# Patient Record
Sex: FEMALE | Race: BLACK OR AFRICAN AMERICAN | NOT HISPANIC OR LATINO
[De-identification: names, ages, dates, MRNs, and addresses within clinical notes are randomized per-mention and may not be internally consistent; named-entity substitution may affect disease eponyms.]

---

## 2017-12-27 PROBLEM — Z00.00 ENCOUNTER FOR PREVENTIVE HEALTH EXAMINATION: Status: ACTIVE | Noted: 2017-12-27

## 2018-01-23 ENCOUNTER — APPOINTMENT (OUTPATIENT)
Dept: PLASTIC SURGERY | Facility: CLINIC | Age: 53
End: 2018-01-23

## 2018-05-31 ENCOUNTER — OUTPATIENT (OUTPATIENT)
Dept: OUTPATIENT SERVICES | Facility: HOSPITAL | Age: 53
LOS: 1 days | Discharge: HOME | End: 2018-05-31

## 2018-05-31 DIAGNOSIS — Z12.31 ENCOUNTER FOR SCREENING MAMMOGRAM FOR MALIGNANT NEOPLASM OF BREAST: ICD-10-CM

## 2019-06-18 ENCOUNTER — APPOINTMENT (OUTPATIENT)
Dept: PLASTIC SURGERY | Facility: CLINIC | Age: 54
End: 2019-06-18

## 2019-07-23 ENCOUNTER — APPOINTMENT (OUTPATIENT)
Dept: PLASTIC SURGERY | Facility: CLINIC | Age: 54
End: 2019-07-23
Payer: MEDICAID

## 2019-07-23 VITALS — BODY MASS INDEX: 36.1 KG/M2 | HEIGHT: 67 IN | WEIGHT: 230 LBS

## 2019-07-23 DIAGNOSIS — Z78.9 OTHER SPECIFIED HEALTH STATUS: ICD-10-CM

## 2019-07-23 DIAGNOSIS — Z86.79 PERSONAL HISTORY OF OTHER DISEASES OF THE CIRCULATORY SYSTEM: ICD-10-CM

## 2019-07-23 DIAGNOSIS — Z86.39 PERSONAL HISTORY OF OTHER ENDOCRINE, NUTRITIONAL AND METABOLIC DISEASE: ICD-10-CM

## 2019-07-23 PROCEDURE — 99212 OFFICE O/P EST SF 10 MIN: CPT

## 2019-07-23 RX ORDER — METOPROLOL TARTRATE 50 MG/1
50 TABLET, FILM COATED ORAL
Refills: 0 | Status: ACTIVE | COMMUNITY

## 2019-07-23 RX ORDER — FISH OIL/E/FATTY ACID5/HERB137 400 MG-5
CAPSULE ORAL
Refills: 0 | Status: ACTIVE | COMMUNITY

## 2019-07-23 RX ORDER — METFORMIN HYDROCHLORIDE 500 MG/1
500 TABLET, COATED ORAL
Refills: 0 | Status: ACTIVE | COMMUNITY

## 2019-07-23 NOTE — HISTORY OF PRESENT ILLNESS
[FreeTextEntry1] : Pt is a 55 y/o F with PMH of NIDDM and HTN who was seen in 2013 for keloids to face s/p acne. Pt had left cheek removed and did well postoperatively. Now presents for a keloid to central chest x2 years, getting progressively larger; initially a small pimple. Has had steroid injection over 8-10 visits with dermatologist ([facility], [place]), reports no improvement. C/o discomfort and pruritus.\par \par Nonsmoker

## 2019-07-23 NOTE — PHYSICAL EXAM
[de-identified] : well-appearing, NAD [de-identified] : Central chest with 3.75x1 cm and 2.5 x 0.5 cm horizontal keloids, larger keloid inferior to smaller

## 2019-07-23 NOTE — ASSESSMENT
[FreeTextEntry1] : 53 y/o F with central chest keloid x2\par \par PE  2.5cm x 0.5cm sup keloid\par      4cm x 1cm inferior keloid\par \par suggest excision w steroid injection\par \par Regarding the procedure, we discussed scarring, poor wound healing, bleeding, infection, need for additional surgery, and dissatisfaction with the outcome.  Also discussed possibility of keloid and/or hypertrophic scar formation as well as recurrence.  All questions were answered and risks understood.\par \par Pt aware recurrence

## 2019-09-06 ENCOUNTER — APPOINTMENT (OUTPATIENT)
Dept: PLASTIC SURGERY | Facility: CLINIC | Age: 54
End: 2019-09-06
Payer: MEDICAID

## 2019-09-06 ENCOUNTER — LABORATORY RESULT (OUTPATIENT)
Age: 54
End: 2019-09-06

## 2019-09-06 ENCOUNTER — OUTPATIENT (OUTPATIENT)
Dept: OUTPATIENT SERVICES | Facility: HOSPITAL | Age: 54
LOS: 1 days | Discharge: HOME | End: 2019-09-06

## 2019-09-06 PROCEDURE — 11406 EXC TR-EXT B9+MARG >4.0 CM: CPT

## 2019-09-06 PROCEDURE — 13102 CMPLX RPR TRUNK ADDL 5CM/<: CPT

## 2019-09-06 PROCEDURE — 13101 CMPLX RPR TRUNK 2.6-7.5 CM: CPT | Mod: 59

## 2019-09-06 PROCEDURE — 11402 EXC TR-EXT B9+MARG 1.1-2 CM: CPT | Mod: 59

## 2019-09-09 DIAGNOSIS — L91.0 HYPERTROPHIC SCAR: ICD-10-CM

## 2019-09-11 NOTE — PROCEDURE
[FreeTextEntry6] : Patient is a 54 year old female with  central chest superior  keloid measuring approximately 2.5 x 0.5 cm and central chest inferior keloid measuring approximately 4 cm  x 1 cm. \par \par The areas were  prepped and draped in the usual fashion.  Local anesthetic was administered using 1% lidocaine with epinephrine.\par \par Keloids were  sharply excised.  Areas were irrigated copiously.  Complex wound closure was performed in layers.  The superior keloid wound measuring  approximately 3 cm.and inferior keloid wound  measuring approximately 5 cm.\par \par Sterile dressing applied.  \par \par Patient tolerated procedure well and understands post-op instructions.\par \par Sutures Used: 3-0 prolene\par \par \par \par \par \par

## 2019-09-16 ENCOUNTER — APPOINTMENT (OUTPATIENT)
Dept: PLASTIC SURGERY | Facility: CLINIC | Age: 54
End: 2019-09-16
Payer: MEDICAID

## 2019-09-17 ENCOUNTER — APPOINTMENT (OUTPATIENT)
Dept: PLASTIC SURGERY | Facility: CLINIC | Age: 54
End: 2019-09-17
Payer: MEDICAID

## 2019-09-17 PROCEDURE — 99212 OFFICE O/P EST SF 10 MIN: CPT

## 2019-09-17 NOTE — ASSESSMENT
[FreeTextEntry1] : 55 y/o F with central chest keloid x2, now POD #11 s/p excision with steroid injection, with superficial dehisence to both\par -Sutures removed\par -Daily aquaphor x1 week, then scaraway\par -F/u 2 weeks with Dr. Melendez

## 2019-09-17 NOTE — PHYSICAL EXAM
[de-identified] : well-appearing, NAD [de-identified] : Central chest two healing incisions, superior scar with 3cm superficial dehicsence centrally, inferior with 1cm superficial dehiscenses to both lateral aspects, no s/s cellulitis

## 2019-09-17 NOTE — HISTORY OF PRESENT ILLNESS
[FreeTextEntry1] : Pt is a 53 y/o F with PMH of NIDDM and HTN who was seen in 2013 for keloids to face s/p acne. Pt had left cheek removed and did well postoperatively. Now presents for a keloid to central chest x2 years, getting progressively larger; initially a small pimple. Has had steroid injection over 8-10 visits with dermatologist ([facility], [place]), reports no improvement. C/o discomfort and pruritus.\par \par Nonsmoker\par \par Interval hx (9/17/19): Pt presents for f/u- POD #11 s/p excision of central chest keloid x2 with steroid injections. Pt offers no complaints. States she went to the gym twice after procedure, started the day after, and was using the bench press.

## 2019-09-17 NOTE — DATA REVIEWED
[FreeTextEntry1] : \par  Biopsy             Final\par \par No Documents Attached\par \par \par \par \par   PRIETO, ELLA                         1\par \par \par \par                  Surgical Final Report\par \par \par \par \par           Final Diagnosis\par           Central chest keloid, excision:\par            - Keloid.\par \par           Verified by: Gustavo Bcaa M.D.\par           (Electronic Signature)\par           Reported on: 09/12/19 17:15 EDT, [address] [place] [address],[facility] [address]\par           _________________________________________________________________\par \par           Clinical History\par           Excision central chest keloids\par \par           Specimen(s) Submitted\par           Central chest keloid\par \par           Gross Description\par           The specimen is received in formalin, labeled "central chest\par           keloid" and consists of two unoriented fragments of dark gray\par           skin ellipse with underlying subcutaneous tissue. The larger\par           fragment measures 3.5 x 1.1 x 1.1 cm. The smaller fragment\par           measures 2.2 x 0.5 x 0.5 cm. Serial section reveals dark gray to\par           brown cut surface. Representative sections are submitted. (1\par           block)\par \par           Specimen was received and underwent gross examination at [facility], [address] [place] [address],\[facility] [address].\par \par           09/10/19 09:31 mt\par \par           Perioperative Diagnosis\par           L91.0-Hypertrophic scar\par \par  \par \par  Ordered by: LUX GOMES IV       Collected/Examined: 06Sep2019 11:46AM       \par Verified by: LOAN GOODMAN 16Sep2019 02:29PM       \par  Result Communication: Discussed results with patient;\par Stage: Final       \par  Performed at: [facility] (Med Director: Nahum Simms M.D)       Resulted: 12Sep2019 05:15PM       Last Updated: 16Sep2019 02:29PM       Accession: [id number]

## 2019-10-01 ENCOUNTER — APPOINTMENT (OUTPATIENT)
Dept: PLASTIC SURGERY | Facility: CLINIC | Age: 54
End: 2019-10-01

## 2021-08-13 ENCOUNTER — APPOINTMENT (OUTPATIENT)
Dept: PLASTIC SURGERY | Facility: CLINIC | Age: 56
End: 2021-08-13
Payer: COMMERCIAL

## 2021-08-13 PROCEDURE — 99212 OFFICE O/P EST SF 10 MIN: CPT

## 2021-08-13 NOTE — PHYSICAL EXAM
[de-identified] : well-appearing, NAD [de-identified] : Central chest two healing incisions, superior scar with 3cm superficial dehicsence centrally, inferior with 1cm superficial dehiscenses to both lateral aspects, no s/s cellulitis

## 2021-08-13 NOTE — ASSESSMENT
[FreeTextEntry1] : 55 y/o F with central chest keloid x2, now POD #11 s/p excision with steroid injection, with superficial dehisence to both\par -Sutures removed\par -Daily aquaphor x1 week, then scaraway\par -F/u 2 weeks with Dr. Melendez\par \par 8/13/2021\par as above\par doing well overall except has two recurrent chest keloids\par \par sup 7.5cm x 1.6cm     inf 8.2cm x 2cm\par \par suggest excision w post procedure RT--pt previously had excision w post procedure RT for left facial keloid w excellent results\par \par to see Vignieri again\par \par Regarding the procedure, we discussed scarring, poor wound healing, bleeding, infection, need for additional surgery, and dissatisfaction with the outcome.  Also discussed possibility of keloid and/or hypertrophic scar formation as well as recurrence.  All questions were answered and risks understood.\par \par vaccinated\par \par Photos taken with patient permission.\par \par \par Due to COVID 19, pre-visit patient instructions were explained to the patient and their symptoms were checked upon arrival.  \par Masks were used by the health care providers and staff and the examination room was cleaned after the patient visit was completed.\par

## 2021-08-13 NOTE — DATA REVIEWED
[FreeTextEntry1] : \par  Biopsy             Final\par \par No Documents Attached\par \par \par \par \par   PRIETO, ELLA                         1\par \par \par \par                  Surgical Final Report\par \par \par \par \par           Final Diagnosis\par           Central chest keloid, excision:\par            - Keloid.\par \par           Verified by: Gustavo Baca M.D.\par           (Electronic Signature)\par           Reported on: 09/12/19 17:15 EDT, [address] [place] [address],[facility] [address]\par           _________________________________________________________________\par \par           Clinical History\par           Excision central chest keloids\par \par           Specimen(s) Submitted\par           Central chest keloid\par \par           Gross Description\par           The specimen is received in formalin, labeled "central chest\par           keloid" and consists of two unoriented fragments of dark gray\par           skin ellipse with underlying subcutaneous tissue. The larger\par           fragment measures 3.5 x 1.1 x 1.1 cm. The smaller fragment\par           measures 2.2 x 0.5 x 0.5 cm. Serial section reveals dark gray to\par           brown cut surface. Representative sections are submitted. (1\par           block)\par \par           Specimen was received and underwent gross examination at [facility], [address] [place] [address],\[facility] [address].\par \par           09/10/19 09:31 mt\par \par           Perioperative Diagnosis\par           L91.0-Hypertrophic scar\par \par  \par \par  Ordered by: LUX GOMES IV       Collected/Examined: 06Sep2019 11:46AM       \par Verified by: LOAN GOODMAN 16Sep2019 02:29PM       \par  Result Communication: Discussed results with patient;\par Stage: Final       \par  Performed at: [facility] (Med Director: Nahum Simms M.D)       Resulted: 12Sep2019 05:15PM       Last Updated: 16Sep2019 02:29PM       Accession: [id number]

## 2021-08-13 NOTE — HISTORY OF PRESENT ILLNESS
[FreeTextEntry1] : Pt is a 55 y/o F with PMH of NIDDM and HTN who was seen in 2013 for keloids to face s/p acne. Pt had left cheek removed and did well postoperatively. Now presents for a keloid to central chest x2 years, getting progressively larger; initially a small pimple. Has had steroid injection over 8-10 visits with dermatologist ([facility], [place]), reports no improvement. C/o discomfort and pruritus.\par \par Nonsmoker\par \par Interval hx (9/17/19): Pt presents for f/u- POD #11 s/p excision of central chest keloid x2 with steroid injections. Pt offers no complaints. States she went to the gym twice after procedure, started the day after, and was using the bench press.

## 2021-08-25 ENCOUNTER — APPOINTMENT (OUTPATIENT)
Dept: RADIATION ONCOLOGY | Facility: HOSPITAL | Age: 56
End: 2021-08-25
Payer: COMMERCIAL

## 2021-08-25 ENCOUNTER — OUTPATIENT (OUTPATIENT)
Dept: OUTPATIENT SERVICES | Facility: HOSPITAL | Age: 56
LOS: 1 days | Discharge: HOME | End: 2021-08-25
Payer: COMMERCIAL

## 2021-08-25 VITALS
RESPIRATION RATE: 12 BRPM | HEART RATE: 77 BPM | SYSTOLIC BLOOD PRESSURE: 176 MMHG | TEMPERATURE: 97.4 F | DIASTOLIC BLOOD PRESSURE: 91 MMHG | WEIGHT: 240 LBS | BODY MASS INDEX: 37.59 KG/M2

## 2021-08-25 DIAGNOSIS — L91.0 HYPERTROPHIC SCAR: ICD-10-CM

## 2021-08-25 PROCEDURE — 99242 OFF/OP CONSLTJ NEW/EST SF 20: CPT | Mod: 25

## 2021-08-25 PROCEDURE — 77263 THER RADIOLOGY TX PLNG CPLX: CPT

## 2021-08-25 NOTE — PHYSICAL EXAM
[Normal] : well developed, well nourished, in no acute distress [de-identified] : There remain several keloids on the cheeks and upper neck (near the ears).  Otherwise on the anterior chest wall are two keloids as whoon, both about 6.5 cm.

## 2021-08-25 NOTE — HISTORY OF PRESENT ILLNESS
[FreeTextEntry1] : Bambi Rooney is a 56 year old known to us from 2013 when keloids on her cheek were treated with excision and post-operative radiation. \par \par She now presents with two keloids on her anterior chest (see above).  She is agreeable to excision to be followed by radiation.

## 2021-08-25 NOTE — DISEASE MANAGEMENT
[Clinical] : TNM Stage: c [N/A] : Currently not applicable [TTNM] : . [NTNM] : . [MTNM] : . [de-identified] : Keloid on chest

## 2021-09-13 ENCOUNTER — NON-APPOINTMENT (OUTPATIENT)
Age: 56
End: 2021-09-13

## 2022-02-24 ENCOUNTER — APPOINTMENT (OUTPATIENT)
Dept: PLASTIC SURGERY | Facility: CLINIC | Age: 57
End: 2022-02-24
Payer: COMMERCIAL

## 2022-02-24 PROCEDURE — 99212 OFFICE O/P EST SF 10 MIN: CPT

## 2022-02-24 NOTE — PHYSICAL EXAM
[de-identified] : well-appearing, NAD [de-identified] : Central chest with two large keloids, raised ad tender to palpation - superior measuring 7.5 x 1.3 cm, inferior keloid 7x1.8 cm

## 2022-02-24 NOTE — HISTORY OF PRESENT ILLNESS
[FreeTextEntry1] : Pt is a 55 y/o F with PMH of NIDDM and HTN who was seen in 2013 for keloids to face s/p acne. Pt had left cheek removed and did well postoperatively. Now presents for a keloid to central chest x2 years, getting progressively larger; initially a small pimple. Has had steroid injection over 8-10 visits with dermatologist ([facility], [place]), reports no improvement. C/o discomfort and pruritus.\par \par Nonsmoker\par \par Interval hx (9/17/19): Pt presents for f/u- POD #11 s/p excision of central chest keloid x2 with steroid injections. Pt offers no complaints. States she went to the gym twice after procedure, started the day after, and was using the bench press.\par \par Interval hx (2/24/22): Pt presents today for f/u s/p excision of central chest keloid x2 in September 2019 with recurrence c/o burning pain, pressure and pruritus. Saw Dr. Padilla and is a candidate for RT post excision.

## 2022-02-24 NOTE — ASSESSMENT
[FreeTextEntry1] : 57 y/o F with central chest keloid x2, now s/p excision with steroid injection, with superficial dehiscence to both\par now with recurrence and painful scar. \par \par - recommend excision with post-op RT\par \par \par \par Due to COVID 19, pre-visit patient instructions were explained to the patient and their symptoms were checked upon arrival.  \par Masks were used by the health care providers and staff and the examination room was cleaned after the patient visit was completed.\par \par \par prior RT submitted for chest keldoi denied acc to RT--I have contacted Dr Padilla for further information/discussion\par \par surgical excision of chest keloids w postprocedure RT remains the best tx option for these two large chest keloids (which are slighly larger than prior visit--approx 7cm x 1.5cm and 7.2cm x 1.8cm)\par \par def plan pending more info re RT\par \par Due to COVID 19, pre-visit patient instructions were explained to the patient and their symptoms were checked upon arrival.  \par Masks were used by the health care providers and staff and the examination room was cleaned after the patient visit was completed.\par

## 2022-03-15 ENCOUNTER — OUTPATIENT (OUTPATIENT)
Dept: OUTPATIENT SERVICES | Facility: HOSPITAL | Age: 57
LOS: 1 days | Discharge: HOME | End: 2022-03-15
Payer: COMMERCIAL

## 2022-03-15 VITALS
DIASTOLIC BLOOD PRESSURE: 88 MMHG | RESPIRATION RATE: 18 BRPM | HEIGHT: 67 IN | TEMPERATURE: 97 F | HEART RATE: 58 BPM | SYSTOLIC BLOOD PRESSURE: 140 MMHG | WEIGHT: 238.1 LBS | OXYGEN SATURATION: 100 %

## 2022-03-15 DIAGNOSIS — Z01.818 ENCOUNTER FOR OTHER PREPROCEDURAL EXAMINATION: ICD-10-CM

## 2022-03-15 DIAGNOSIS — L91.0 HYPERTROPHIC SCAR: ICD-10-CM

## 2022-03-15 DIAGNOSIS — Z98.891 HISTORY OF UTERINE SCAR FROM PREVIOUS SURGERY: Chronic | ICD-10-CM

## 2022-03-15 DIAGNOSIS — Z98.890 OTHER SPECIFIED POSTPROCEDURAL STATES: Chronic | ICD-10-CM

## 2022-03-15 LAB
A1C WITH ESTIMATED AVERAGE GLUCOSE RESULT: 10 % — HIGH (ref 4–5.6)
ALBUMIN SERPL ELPH-MCNC: 4.6 G/DL — SIGNIFICANT CHANGE UP (ref 3.5–5.2)
ALP SERPL-CCNC: 88 U/L — SIGNIFICANT CHANGE UP (ref 30–115)
ALT FLD-CCNC: 28 U/L — SIGNIFICANT CHANGE UP (ref 0–41)
ANION GAP SERPL CALC-SCNC: 13 MMOL/L — SIGNIFICANT CHANGE UP (ref 7–14)
APTT BLD: 28.6 SEC — SIGNIFICANT CHANGE UP (ref 27–39.2)
AST SERPL-CCNC: 21 U/L — SIGNIFICANT CHANGE UP (ref 0–41)
BASOPHILS # BLD AUTO: 0.03 K/UL — SIGNIFICANT CHANGE UP (ref 0–0.2)
BASOPHILS NFR BLD AUTO: 0.6 % — SIGNIFICANT CHANGE UP (ref 0–1)
BILIRUB SERPL-MCNC: 0.4 MG/DL — SIGNIFICANT CHANGE UP (ref 0.2–1.2)
BUN SERPL-MCNC: 20 MG/DL — SIGNIFICANT CHANGE UP (ref 10–20)
CALCIUM SERPL-MCNC: 9.4 MG/DL — SIGNIFICANT CHANGE UP (ref 8.5–10.1)
CHLORIDE SERPL-SCNC: 102 MMOL/L — SIGNIFICANT CHANGE UP (ref 98–110)
CO2 SERPL-SCNC: 26 MMOL/L — SIGNIFICANT CHANGE UP (ref 17–32)
CREAT SERPL-MCNC: 0.9 MG/DL — SIGNIFICANT CHANGE UP (ref 0.7–1.5)
EGFR: 75 ML/MIN/1.73M2 — SIGNIFICANT CHANGE UP
EOSINOPHIL # BLD AUTO: 0.13 K/UL — SIGNIFICANT CHANGE UP (ref 0–0.7)
EOSINOPHIL NFR BLD AUTO: 2.4 % — SIGNIFICANT CHANGE UP (ref 0–8)
ESTIMATED AVERAGE GLUCOSE: 240 MG/DL — HIGH (ref 68–114)
GLUCOSE SERPL-MCNC: 212 MG/DL — HIGH (ref 70–99)
HCT VFR BLD CALC: 38.5 % — SIGNIFICANT CHANGE UP (ref 37–47)
HGB BLD-MCNC: 12.7 G/DL — SIGNIFICANT CHANGE UP (ref 12–16)
IMM GRANULOCYTES NFR BLD AUTO: 0.2 % — SIGNIFICANT CHANGE UP (ref 0.1–0.3)
LYMPHOCYTES # BLD AUTO: 1.99 K/UL — SIGNIFICANT CHANGE UP (ref 1.2–3.4)
LYMPHOCYTES # BLD AUTO: 36.6 % — SIGNIFICANT CHANGE UP (ref 20.5–51.1)
MCHC RBC-ENTMCNC: 26.3 PG — LOW (ref 27–31)
MCHC RBC-ENTMCNC: 33 G/DL — SIGNIFICANT CHANGE UP (ref 32–37)
MCV RBC AUTO: 79.9 FL — LOW (ref 81–99)
MONOCYTES # BLD AUTO: 0.38 K/UL — SIGNIFICANT CHANGE UP (ref 0.1–0.6)
MONOCYTES NFR BLD AUTO: 7 % — SIGNIFICANT CHANGE UP (ref 1.7–9.3)
NEUTROPHILS # BLD AUTO: 2.89 K/UL — SIGNIFICANT CHANGE UP (ref 1.4–6.5)
NEUTROPHILS NFR BLD AUTO: 53.2 % — SIGNIFICANT CHANGE UP (ref 42.2–75.2)
NRBC # BLD: 0 /100 WBCS — SIGNIFICANT CHANGE UP (ref 0–0)
PLATELET # BLD AUTO: 228 K/UL — SIGNIFICANT CHANGE UP (ref 130–400)
POTASSIUM SERPL-MCNC: 4.2 MMOL/L — SIGNIFICANT CHANGE UP (ref 3.5–5)
POTASSIUM SERPL-SCNC: 4.2 MMOL/L — SIGNIFICANT CHANGE UP (ref 3.5–5)
PROT SERPL-MCNC: 7.5 G/DL — SIGNIFICANT CHANGE UP (ref 6–8)
RBC # BLD: 4.82 M/UL — SIGNIFICANT CHANGE UP (ref 4.2–5.4)
RBC # FLD: 15.4 % — HIGH (ref 11.5–14.5)
SODIUM SERPL-SCNC: 141 MMOL/L — SIGNIFICANT CHANGE UP (ref 135–146)
WBC # BLD: 5.43 K/UL — SIGNIFICANT CHANGE UP (ref 4.8–10.8)
WBC # FLD AUTO: 5.43 K/UL — SIGNIFICANT CHANGE UP (ref 4.8–10.8)

## 2022-03-15 PROCEDURE — 93010 ELECTROCARDIOGRAM REPORT: CPT

## 2022-03-15 NOTE — H&P PST ADULT - NSANTHOSAYNRD_GEN_A_CORE
No. PASCUAL screening performed.  STOP BANG Legend: 0-2 = LOW Risk; 3-4 = INTERMEDIATE Risk; 5-8 = HIGH Risk

## 2022-03-15 NOTE — H&P PST ADULT - HISTORY OF PRESENT ILLNESS
pt with hx of keloids  had removed last yr but returned and is very itchy and "spicy"  will have radiation post op for the keloids not to return       PATIENT CURRENTLY DENIES CHEST PAIN  SHORTNESS OF BREATH  PALPITATIONS,  DYSURIA, OR UPPER RESPIRATORY INFECTION IN PAST 2 WEEKS  EXERCISE  TOLERANCE  1-2 FLIGHT OF STAIRS  WITHOUT SHORTNESS OF BREATH  denies travel outside the [place] in the past 30 days  Patient denies any signs or symptoms of COVID 19 and denies contact with known positive individuals.  They have an appointment for repeat COVID testing pre-procedure and acknowledge its time and place.  They were instructed to quarantine pre-procedure, practice exposure control measures, continue to self-monitor and report any concerns to their proceduralist.  pt advised self quarantine till day of procedure  Anesthesia Alert  NO--Difficult Airway  NO--History of neck surgery or radiation  NO--Limited ROM of neck  NO--History of Malignant hyperthermia  NO--No personal or family history of Pseudocholinesterase deficiency.  NO--Prior Anesthesia Complication  NO--Latex Allergy  NO--Loose teeth  NO--History of Rheumatoid Arthritis  NO--Bleeding risk  NO--PASCUAL  NO--Other_____    PT DENIES ANY RASHES, ABRASION, OR OPEN WOUNDS OR CUTS    AS PER THE PT, THIS IS HIS/HER COMPLETE MEDICAL AND SURGICAL HX, INCLUDING MEDICATIONS PRESCRIBED AND OVER THE COUNTER    Patient verbalized understanding of instructions and was given the opportunity to ask questions and have them answered.    pt denies any suicidal ideation or thoughts, pt states not a threat to self or others    L91.0/14001    Hypertrophic scar    Encounter for other preprocedural examination    ^L91.0/14001    Hypertrophic scar    Encounter for other preprocedural examination

## 2022-03-15 NOTE — H&P PST ADULT - REASON FOR ADMISSION
Patient is 56 a  year old  female presenting to PAST in preparation for  : EXCISION OF TWO CHEST KELOIDS / COVERAGE WITH LOCAL FLAP on   4/4/22 under general anesthesia by Dr. cruz

## 2022-03-29 PROBLEM — I10 ESSENTIAL (PRIMARY) HYPERTENSION: Chronic | Status: ACTIVE | Noted: 2022-03-15

## 2022-03-29 PROBLEM — L91.0 HYPERTROPHIC SCAR: Chronic | Status: ACTIVE | Noted: 2022-03-15

## 2022-04-01 ENCOUNTER — NON-APPOINTMENT (OUTPATIENT)
Age: 57
End: 2022-04-01

## 2022-04-04 ENCOUNTER — APPOINTMENT (OUTPATIENT)
Dept: PLASTIC SURGERY | Facility: AMBULATORY SURGERY CENTER | Age: 57
End: 2022-04-04

## 2022-04-11 ENCOUNTER — APPOINTMENT (OUTPATIENT)
Dept: PLASTIC SURGERY | Facility: CLINIC | Age: 57
End: 2022-04-11

## 2022-06-23 ENCOUNTER — APPOINTMENT (OUTPATIENT)
Dept: PLASTIC SURGERY | Facility: CLINIC | Age: 57
End: 2022-06-23
Payer: COMMERCIAL

## 2022-06-23 PROCEDURE — 99212 OFFICE O/P EST SF 10 MIN: CPT

## 2022-06-23 NOTE — ASSESSMENT
[FreeTextEntry1] : 58 y/o F with central chest keloid x2, now s/p excision with steroid injection, with superficial dehiscence to both.\par Now with recurrence and painful keloid scars recalcitrant to conservative treatment. \par \par - recommend excision with post-op RT as previously discussed with Dr. Melendez \par - will schedule\par \par Due to COVID 19, pre-visit patient instructions were explained to the patient and their symptoms were checked upon arrival.  \par Masks were used by the health care providers and staff and the examination room was cleaned after the patient visit was completed.\par \par \par \par \par \par

## 2022-06-23 NOTE — PHYSICAL EXAM
[de-identified] : well-appearing, overweight, NAD [de-identified] : unlabored breathing [de-identified] : LISAR [de-identified] : Central chest with two large keloids, raised and tender to palpation - superior measuring 9 x 1.4 cm, inferior keloid 8x2 cm

## 2022-06-23 NOTE — HISTORY OF PRESENT ILLNESS
Patient presents today for a fibroscan. He mentioned he has been feeling down, depressed, and having trouble sleeping. He states he has racing thoughts and feelings of anxiety. He has no pain today, but did experience pain lastnight. [FreeTextEntry1] : Pt is a 53 y/o F with PMH of NIDDM and HTN who was seen in 2013 for keloids to face s/p acne. Pt had left cheek removed and did well postoperatively. Now presents for a keloid to central chest x2 years, getting progressively larger; initially a small pimple. Has had steroid injection over 8-10 visits with dermatologist ( Dermatology, [place]), reports no improvement. C/o discomfort and pruritus.\par \par Nonsmoker\par \par Interval hx (9/17/19): Pt presents for f/u- POD #11 s/p excision of central chest keloid x2 with steroid injections. Pt offers no complaints. States she went to the gym twice after procedure, started the day after, and was using the bench press.\par \par Interval hx (2/24/22): Pt presents today for f/u s/p excision of central chest keloid x2 in September 2019 with recurrence c/o burning pain, pressure and pruritus. Saw Dr. Padilla and is a candidate for RT post excision. \par \par Interval hx (6/23/22). Patient presents today for f/u c/o worsening chest keloids increasing in size and causing significant pain, discomfort and pressure. Pt has a new insurance now and would like to proceed with previously canceled surgery.

## 2022-07-27 ENCOUNTER — OUTPATIENT (OUTPATIENT)
Dept: OUTPATIENT SERVICES | Facility: HOSPITAL | Age: 57
LOS: 1 days | Discharge: HOME | End: 2022-07-27

## 2022-07-27 VITALS
TEMPERATURE: 98 F | OXYGEN SATURATION: 98 % | HEIGHT: 67 IN | WEIGHT: 240.3 LBS | RESPIRATION RATE: 16 BRPM | DIASTOLIC BLOOD PRESSURE: 86 MMHG | SYSTOLIC BLOOD PRESSURE: 165 MMHG | HEART RATE: 80 BPM

## 2022-07-27 DIAGNOSIS — Z01.818 ENCOUNTER FOR OTHER PREPROCEDURAL EXAMINATION: ICD-10-CM

## 2022-07-27 DIAGNOSIS — Z98.891 HISTORY OF UTERINE SCAR FROM PREVIOUS SURGERY: Chronic | ICD-10-CM

## 2022-07-27 DIAGNOSIS — Z98.890 OTHER SPECIFIED POSTPROCEDURAL STATES: Chronic | ICD-10-CM

## 2022-07-27 DIAGNOSIS — L91.0 HYPERTROPHIC SCAR: ICD-10-CM

## 2022-07-27 LAB
A1C WITH ESTIMATED AVERAGE GLUCOSE RESULT: 10.2 % — HIGH (ref 4–5.6)
ALBUMIN SERPL ELPH-MCNC: 4.3 G/DL — SIGNIFICANT CHANGE UP (ref 3.5–5.2)
ALP SERPL-CCNC: 83 U/L — SIGNIFICANT CHANGE UP (ref 30–115)
ALT FLD-CCNC: 22 U/L — SIGNIFICANT CHANGE UP (ref 0–41)
ANION GAP SERPL CALC-SCNC: 10 MMOL/L — SIGNIFICANT CHANGE UP (ref 7–14)
APTT BLD: 27.3 SEC — SIGNIFICANT CHANGE UP (ref 27–39.2)
AST SERPL-CCNC: 20 U/L — SIGNIFICANT CHANGE UP (ref 0–41)
BASOPHILS # BLD AUTO: 0.04 K/UL — SIGNIFICANT CHANGE UP (ref 0–0.2)
BASOPHILS NFR BLD AUTO: 0.5 % — SIGNIFICANT CHANGE UP (ref 0–1)
BILIRUB SERPL-MCNC: 0.4 MG/DL — SIGNIFICANT CHANGE UP (ref 0.2–1.2)
BUN SERPL-MCNC: 18 MG/DL — SIGNIFICANT CHANGE UP (ref 10–20)
CALCIUM SERPL-MCNC: 9.5 MG/DL — SIGNIFICANT CHANGE UP (ref 8.5–10.1)
CHLORIDE SERPL-SCNC: 99 MMOL/L — SIGNIFICANT CHANGE UP (ref 98–110)
CO2 SERPL-SCNC: 30 MMOL/L — SIGNIFICANT CHANGE UP (ref 17–32)
CREAT SERPL-MCNC: 1 MG/DL — SIGNIFICANT CHANGE UP (ref 0.7–1.5)
EGFR: 65 ML/MIN/1.73M2 — SIGNIFICANT CHANGE UP
EOSINOPHIL # BLD AUTO: 0.13 K/UL — SIGNIFICANT CHANGE UP (ref 0–0.7)
EOSINOPHIL NFR BLD AUTO: 1.7 % — SIGNIFICANT CHANGE UP (ref 0–8)
ESTIMATED AVERAGE GLUCOSE: 246 MG/DL — HIGH (ref 68–114)
GLUCOSE SERPL-MCNC: 266 MG/DL — HIGH (ref 70–99)
HCT VFR BLD CALC: 36.4 % — LOW (ref 37–47)
HGB BLD-MCNC: 11.8 G/DL — LOW (ref 12–16)
IMM GRANULOCYTES NFR BLD AUTO: 0.3 % — SIGNIFICANT CHANGE UP (ref 0.1–0.3)
INR BLD: 1 RATIO — SIGNIFICANT CHANGE UP (ref 0.65–1.3)
LYMPHOCYTES # BLD AUTO: 2.67 K/UL — SIGNIFICANT CHANGE UP (ref 1.2–3.4)
LYMPHOCYTES # BLD AUTO: 35.6 % — SIGNIFICANT CHANGE UP (ref 20.5–51.1)
MCHC RBC-ENTMCNC: 26 PG — LOW (ref 27–31)
MCHC RBC-ENTMCNC: 32.4 G/DL — SIGNIFICANT CHANGE UP (ref 32–37)
MCV RBC AUTO: 80.2 FL — LOW (ref 81–99)
MONOCYTES # BLD AUTO: 0.53 K/UL — SIGNIFICANT CHANGE UP (ref 0.1–0.6)
MONOCYTES NFR BLD AUTO: 7.1 % — SIGNIFICANT CHANGE UP (ref 1.7–9.3)
NEUTROPHILS # BLD AUTO: 4.11 K/UL — SIGNIFICANT CHANGE UP (ref 1.4–6.5)
NEUTROPHILS NFR BLD AUTO: 54.8 % — SIGNIFICANT CHANGE UP (ref 42.2–75.2)
NRBC # BLD: 0 /100 WBCS — SIGNIFICANT CHANGE UP (ref 0–0)
PLATELET # BLD AUTO: 216 K/UL — SIGNIFICANT CHANGE UP (ref 130–400)
POTASSIUM SERPL-MCNC: 4.2 MMOL/L — SIGNIFICANT CHANGE UP (ref 3.5–5)
POTASSIUM SERPL-SCNC: 4.2 MMOL/L — SIGNIFICANT CHANGE UP (ref 3.5–5)
PROT SERPL-MCNC: 7.3 G/DL — SIGNIFICANT CHANGE UP (ref 6–8)
PROTHROM AB SERPL-ACNC: 11.5 SEC — SIGNIFICANT CHANGE UP (ref 9.95–12.87)
RBC # BLD: 4.54 M/UL — SIGNIFICANT CHANGE UP (ref 4.2–5.4)
RBC # FLD: 15.3 % — HIGH (ref 11.5–14.5)
SODIUM SERPL-SCNC: 139 MMOL/L — SIGNIFICANT CHANGE UP (ref 135–146)
WBC # BLD: 7.5 K/UL — SIGNIFICANT CHANGE UP (ref 4.8–10.8)
WBC # FLD AUTO: 7.5 K/UL — SIGNIFICANT CHANGE UP (ref 4.8–10.8)

## 2022-07-27 PROCEDURE — 93010 ELECTROCARDIOGRAM REPORT: CPT

## 2022-07-27 RX ORDER — METFORMIN HYDROCHLORIDE 850 MG/1
1 TABLET ORAL
Qty: 0 | Refills: 0 | DISCHARGE

## 2022-07-27 NOTE — H&P PST ADULT - HISTORY OF PRESENT ILLNESS
Patient is a 57 year old  female presenting to PAST in preparation for  EXCISION OF TWO CHEST KELOIDS, COVERAGE WITH LOCAL FLAP on 8/8  under general anesthesia by Dr. Melendez .  pt has hx of keloid scarring, had "pimples on chest since 2020, had removed in office", keloids developed& " is very itchy& burning, spicy" case was postponed dt elevated A1C. had diet& medication change& has been rescheduled    PATIENT CURRENTLY DENIES CHEST PAIN  SHORTNESS OF BREATH  PALPITATIONS,  DYSURIA, OR UPPER RESPIRATORY INFECTION IN PAST 2 WEEKS  EXERCISE  TOLERANCE  2-3 FLIGHT OF STAIRS  WITHOUT SHORTNESS OF BREATH  denies travel outside the [place] in the past 30 days  Patient denies any signs or symptoms of COVID 19 and denies contact with known positive individuals.  They have an appointment for repeat COVID testing pre-procedure and acknowledge its time and place.  They were instructed to quarantine pre-procedure, practice exposure control measures, continue to self-monitor and report any concerns to their proceduralist.  pt advised self quarantine till day of procedure  Anesthesia Alert  NO--Difficult Airway  NO--History of neck surgery or radiation  NO--Limited ROM of neck  NO--History of Malignant hyperthermia  NO--No personal or family history of Pseudocholinesterase deficiency.  NO--Prior Anesthesia Complication  NO--Latex Allergy  NO--Loose teeth  NO--History of Rheumatoid Arthritis  NO--Bleeding risk  NO--PASCUAL  NO--Other_____    PT DENIES ANY RASHES, ABRASION, OR OPEN WOUNDS OR CUTS    AS PER THE PT, THIS IS HIS/HER COMPLETE MEDICAL AND SURGICAL HX, INCLUDING MEDICATIONS PRESCRIBED AND OVER THE COUNTER    Patient verbalized understanding of instructions and was given the opportunity to ask questions and have them answered.    pt denies any suicidal ideation or thoughts, pt states not a threat to self or others

## 2022-07-27 NOTE — H&P PST ADULT - NSICDXPASTMEDICALHX_GEN_ALL_CORE_FT
PAST MEDICAL HISTORY:  DM (diabetes mellitus)     HTN (hypertension)     Obese     Scarring, keloid

## 2022-08-08 ENCOUNTER — APPOINTMENT (OUTPATIENT)
Dept: PLASTIC SURGERY | Facility: AMBULATORY SURGERY CENTER | Age: 57
End: 2022-08-08

## 2022-08-08 PROBLEM — E66.9 OBESITY, UNSPECIFIED: Chronic | Status: ACTIVE | Noted: 2022-07-27

## 2022-08-08 PROBLEM — E11.9 TYPE 2 DIABETES MELLITUS WITHOUT COMPLICATIONS: Chronic | Status: ACTIVE | Noted: 2022-07-27

## 2022-08-15 ENCOUNTER — APPOINTMENT (OUTPATIENT)
Dept: PLASTIC SURGERY | Facility: CLINIC | Age: 57
End: 2022-08-15

## 2022-08-17 ENCOUNTER — NON-APPOINTMENT (OUTPATIENT)
Age: 57
End: 2022-08-17

## 2022-08-17 DIAGNOSIS — M79.2 NEURALGIA AND NEURITIS, UNSPECIFIED: ICD-10-CM

## 2022-08-17 RX ORDER — GABAPENTIN 300 MG/1
300 CAPSULE ORAL
Qty: 7 | Refills: 0 | Status: ACTIVE | COMMUNITY
Start: 2022-08-17 | End: 1900-01-01

## 2022-08-19 ENCOUNTER — LABORATORY RESULT (OUTPATIENT)
Age: 57
End: 2022-08-19

## 2022-08-19 NOTE — ASU PATIENT PROFILE, ADULT - FALL HARM RISK - UNIVERSAL INTERVENTIONS
Bed in lowest position, wheels locked, appropriate side rails in place/Call bell, personal items and telephone in reach/Instruct patient to call for assistance before getting out of bed or chair/Non-slip footwear when patient is out of bed/[place] to call system/Physically safe environment - no spills, clutter or unnecessary equipment/Purposeful Proactive Rounding/Room/bathroom lighting operational, light cord in reach

## 2022-08-22 ENCOUNTER — APPOINTMENT (OUTPATIENT)
Dept: PLASTIC SURGERY | Facility: AMBULATORY SURGERY CENTER | Age: 57
End: 2022-08-22

## 2022-08-22 ENCOUNTER — TRANSCRIPTION ENCOUNTER (OUTPATIENT)
Age: 57
End: 2022-08-22

## 2022-08-22 ENCOUNTER — RESULT REVIEW (OUTPATIENT)
Age: 57
End: 2022-08-22

## 2022-08-22 ENCOUNTER — OUTPATIENT (OUTPATIENT)
Dept: OUTPATIENT SERVICES | Facility: HOSPITAL | Age: 57
LOS: 1 days | Discharge: HOME | End: 2022-08-22

## 2022-08-22 VITALS
DIASTOLIC BLOOD PRESSURE: 98 MMHG | WEIGHT: 240.3 LBS | TEMPERATURE: 99 F | RESPIRATION RATE: 16 BRPM | HEIGHT: 67 IN | HEART RATE: 80 BPM | SYSTOLIC BLOOD PRESSURE: 195 MMHG | OXYGEN SATURATION: 100 %

## 2022-08-22 VITALS
SYSTOLIC BLOOD PRESSURE: 145 MMHG | DIASTOLIC BLOOD PRESSURE: 68 MMHG | RESPIRATION RATE: 18 BRPM | OXYGEN SATURATION: 98 % | HEART RATE: 79 BPM

## 2022-08-22 DIAGNOSIS — Z98.891 HISTORY OF UTERINE SCAR FROM PREVIOUS SURGERY: Chronic | ICD-10-CM

## 2022-08-22 DIAGNOSIS — Z98.890 OTHER SPECIFIED POSTPROCEDURAL STATES: Chronic | ICD-10-CM

## 2022-08-22 DIAGNOSIS — L91.0 HYPERTROPHIC SCAR: ICD-10-CM

## 2022-08-22 LAB — GLUCOSE BLDC GLUCOMTR-MCNC: 148 MG/DL — HIGH (ref 70–99)

## 2022-08-22 PROCEDURE — 14301 TIS TRNFR ANY 30.1-60 SQ CM: CPT

## 2022-08-22 PROCEDURE — 88305 TISSUE EXAM BY PATHOLOGIST: CPT | Mod: 26

## 2022-08-22 PROCEDURE — 77290 THER RAD SIMULAJ FIELD CPLX: CPT | Mod: 26

## 2022-08-22 RX ORDER — TRAMADOL HYDROCHLORIDE 50 MG/1
1 TABLET ORAL
Qty: 10 | Refills: 0
Start: 2022-08-22

## 2022-08-22 RX ORDER — SITAGLIPTIN 50 MG/1
1 TABLET, FILM COATED ORAL
Qty: 0 | Refills: 0 | DISCHARGE

## 2022-08-22 RX ORDER — AMLODIPINE BESYLATE 2.5 MG/1
1 TABLET ORAL
Qty: 0 | Refills: 0 | DISCHARGE

## 2022-08-22 RX ORDER — ONDANSETRON 8 MG/1
4 TABLET, FILM COATED ORAL ONCE
Refills: 0 | Status: DISCONTINUED | OUTPATIENT
Start: 2022-08-22 | End: 2022-09-05

## 2022-08-22 RX ORDER — CEPHALEXIN 500 MG
1 CAPSULE ORAL
Qty: 28 | Refills: 0
Start: 2022-08-22 | End: 2022-08-28

## 2022-08-22 RX ORDER — SODIUM CHLORIDE 9 MG/ML
1000 INJECTION, SOLUTION INTRAVENOUS
Refills: 0 | Status: DISCONTINUED | OUTPATIENT
Start: 2022-08-22 | End: 2022-09-05

## 2022-08-22 RX ORDER — HYDROMORPHONE HYDROCHLORIDE 2 MG/ML
0.5 INJECTION INTRAMUSCULAR; INTRAVENOUS; SUBCUTANEOUS
Refills: 0 | Status: DISCONTINUED | OUTPATIENT
Start: 2022-08-22 | End: 2022-08-22

## 2022-08-22 RX ORDER — METFORMIN HYDROCHLORIDE 850 MG/1
1 TABLET ORAL
Qty: 0 | Refills: 0 | DISCHARGE

## 2022-08-22 RX ORDER — METOPROLOL TARTRATE 50 MG
1 TABLET ORAL
Qty: 0 | Refills: 0 | DISCHARGE

## 2022-08-22 RX ADMIN — SODIUM CHLORIDE 75 MILLILITER(S): 9 INJECTION, SOLUTION INTRAVENOUS at 13:02

## 2022-08-22 NOTE — CHART NOTE - NSCHARTNOTEFT_GEN_A_CORE
PACU ANESTHESIA ADMISSION NOTE      Procedure:   Post op diagnosis:      ____  Intubated  TV:______       Rate: ______      FiO2: ______    __x__  Patent Airway    ____  Full return of protective reflexes    ____  Full recovery from anesthesia / back to baseline     Vitals:   T:      98     R:      12            BP:  105/53                 Sat:        100%           P:  92      Mental Status:  __x__ Awake   _____ Alert   _____ Drowsy   _____ Sedated    Nausea/Vomiting:  __x__ NO  ______Yes,   See Post - Op Orders          Pain Scale (0-10):  _____    Treatment: ____ None    ___x_ See Post - Op/PCA Orders    Post - Operative Fluids:   ____ Oral   ___x_ See Post - Op Orders    Plan: Discharge:   ____Home       ___x__Floor     _____Critical Care    _____  Other:_________________    Comments: Uneventful intraoperative course. No anesthesia issues or complications noted.  Patient stable upon arrival to PACU. Report given to RN. Discharge when criteria met.

## 2022-08-22 NOTE — ASU DISCHARGE PLAN (ADULT/PEDIATRIC) - NS MD DC FALL RISK RISK
For information on Fall & Injury Prevention, visit: [email or web address] OR  [email or web address] OR  https://www.cdc.gov/steadi/patient.html

## 2022-08-22 NOTE — BRIEF OPERATIVE NOTE - NSICDXBRIEFPROCEDURE_GEN_ALL_CORE_FT
PROCEDURES:  Excision of benign skin lesion (excluding skin tags) of chest wall, excised area 3.6 to 4.0cm in diameter, including margins 22-Aug-2022 13:04:28  Mayito Licona

## 2022-08-22 NOTE — ASU DISCHARGE PLAN (ADULT/PEDIATRIC) - FOLLOW UP APPOINTMENTS
[facility],  Endoscopy/Ambulatory Surgery [facility]:  [facility]

## 2022-08-22 NOTE — ASU DISCHARGE PLAN (ADULT/PEDIATRIC) - ASU DC SPECIAL INSTRUCTIONSFT
You underwent surgical excision of two keloids on your chest. Over your surgical site you have a surgical dressing in place.  Please keep this surgical dressing in place.  It is okay for the radiation oncology team to remove this dressing in order to administer radiation therapy but please cover the incision with telfa guaze and paper tape to hold the dressing in place.      Please keep the surgical site clean and dry until cleared by Dr Melendez.  Please no heavy lifting or any activities that raise your heart rate as this can increase your risk of bleeding and wound healing complications.

## 2022-08-22 NOTE — ASU DISCHARGE PLAN (ADULT/PEDIATRIC) - CARE PROVIDER_API CALL
Darien Melendez)  Plastic Surgery; Surgery of the Hand  [address] [place] [address], [address]  [place] [address]  Phone: [phone]  Fax: [phone]  Follow Up Time: 1 week

## 2022-08-23 LAB — SURGICAL PATHOLOGY STUDY: SIGNIFICANT CHANGE UP

## 2022-08-24 DIAGNOSIS — Z79.84 LONG TERM (CURRENT) USE OF ORAL HYPOGLYCEMIC DRUGS: ICD-10-CM

## 2022-08-24 DIAGNOSIS — I10 ESSENTIAL (PRIMARY) HYPERTENSION: ICD-10-CM

## 2022-08-24 DIAGNOSIS — E11.9 TYPE 2 DIABETES MELLITUS WITHOUT COMPLICATIONS: ICD-10-CM

## 2022-08-24 DIAGNOSIS — L91.0 HYPERTROPHIC SCAR: ICD-10-CM

## 2022-08-24 DIAGNOSIS — E66.9 OBESITY, UNSPECIFIED: ICD-10-CM

## 2022-08-29 ENCOUNTER — APPOINTMENT (OUTPATIENT)
Dept: PLASTIC SURGERY | Facility: CLINIC | Age: 57
End: 2022-08-29

## 2022-08-29 PROCEDURE — 99024 POSTOP FOLLOW-UP VISIT: CPT

## 2022-08-29 NOTE — ASSESSMENT
[FreeTextEntry1] : 58 y/o F with central chest keloid x2, now s/p excision with steroid injection, with superficial dehiscence to both.\par Now with recurrence and painful keloid scars recalcitrant to conservative treatment. \par \par \par POD# 7 s/p 2 chest keloids. \par \par _ path reviewed with pt\par - d/c suture tail\par - ok to continue silicone tape and skin edges well approximated\par - all post op and wound care instructions reviewed\par - Continue post op radiation tx \par - s/s of infection reviewed\par - f/u 3 weeks for scar management \par \par Due to COVID 19, pre-visit patient instructions were explained to the patient and their symptoms were checked upon arrival.  \par Masks were used by the health care providers and staff and the examination room was cleaned after the patient visit was completed.\par \par \par \par \par \par

## 2022-08-29 NOTE — PHYSICAL EXAM
[de-identified] : well-appearing, overweight, NAD [de-identified] : Central chest with two 10cm incisions. both are CDI and healing well, no evidence of cellulitis/seroma/hematoma/drainage. 1 suture tail noted.

## 2022-08-29 NOTE — DATA REVIEWED
[FreeTextEntry1] : \par  Pathology             Final\par \par No Documents Attached\par \par \par \par \par   [facility] Accession Number : [id number]\par Patient:   ELLA MOREAU\par \par \par Accession:                             [id number]\par \par Collected Date/Time:                   8/22/2022 12:09 EDT\par Received Date/Time:                    8/22/2022 13:52 EDT\par \par Surgical Pathology Report - Auth (Verified)\par \par Specimen(s) Submitted\par 1  Inferior chest keloid\par 2  Superior chest keloid\par \par Final Diagnosis\par 1. Inferior chest keloid:\par - Dermal scar with keloidal-type collagen, see comment.\par \par \par 2. Superior chest keloid:\par - Dermal scar with keloidal-type collagen, see comment.\par Verified by: Dennis Dennis M.D\par (Electronic Signature)\par Reported on: 08/23/22 14:23 EDT, [facility],\par [address] [place] [address]\par Phone: [phone]   Fax: [phone]\par _________________________________________________________________\par \par \par Comment\par The findings are consistent with a hypertrophic scar or keloid; clinical\par correlation is required.\par \par Clinical Information\par Excision of two chest keloids\par Central chest keloids\par COVID (-)\par \par Perioperative Diagnosis\par Chest keloids\par \par \par Gross Description\par 1.  The specimen is received in formalin labeled "inferior chest keloid".\par The specimen consists of an ellipse of skin with underlying subcutaneous\par tissue measuring 7 x 1.8 x 1.7 cm.  The entire surface of the skin is\par covered in scar.  Cut surface reveals a thick tan-white subdermis.\par Representative sections are submitted.  (1 block)\par \par 2.  The specimen is received in formalin labeled "superior chest keloid".\par Specimen consists of an ellipse of skin with underlying subcutaneous\par tissue measuring 7.5 x 1.5 x 1.2 cm.  The entire surface of the skin is\par covered in scar.  Cut surface reveals a thick tan-white some dermis.\par Representative sections are submitted.  (1 block)\par \par Specimen was received and underwent gross examination at [facility], [address] [place] [address].\par \par 08/22/2022 16:42:48 EDT   GM\par \par  \par  Ordered by: LUX GOMES IV       Collected/Examined: [id number] 12:09PM       \par Verification Required       Stage: Final       \par  Performed at: [facility]       Resulted: [id number] 02:23PM       Last Updated: [id number] 02:23PM       Accession: [id number]

## 2022-08-29 NOTE — HISTORY OF PRESENT ILLNESS
[FreeTextEntry1] : Pt is a 53 y/o F with PMH of NIDDM and HTN who was seen in 2013 for keloids to face s/p acne. Pt had left cheek removed and did well postoperatively. Now presents for a keloid to central chest x2 years, getting progressively larger; initially a small pimple. Has had steroid injection over 8-10 visits with dermatologist ([facility], [place]), reports no improvement. C/o discomfort and pruritus.\par \par Nonsmoker\par \par Interval hx (9/17/19): Pt presents for f/u- POD #11 s/p excision of central chest keloid x2 with steroid injections. Pt offers no complaints. States she went to the gym twice after procedure, started the day after, and was using the bench press.\par \par Interval hx (2/24/22): Pt presents today for f/u s/p excision of central chest keloid x2 in September 2019 with recurrence c/o burning pain, pressure and pruritus. Saw Dr. Padilla and is a candidate for RT post excision. \par \par Interval hx (6/23/22). Patient presents today for f/u c/o worsening chest keloids increasing in size and causing significant pain, discomfort and pressure. Pt has a new insurance now and would like to proceed with previously canceled surgery. \par \par Interval hx (8/29/22):  Pt is POD# 7 s/p excision of 2x chest keloids on 8/22/22. Path in chart.  Denies fever/chills/n/v/sob/LE pain or swelling. No longer on abx or pain meds. Reoprts pain and itching is fully resolved, very happy with results. s/p radiation to area 2x.  Has been using silicone tape to incision (not recommended by MD, started on her own).

## 2022-09-22 ENCOUNTER — APPOINTMENT (OUTPATIENT)
Dept: PLASTIC SURGERY | Facility: CLINIC | Age: 57
End: 2022-09-22

## 2022-09-22 DIAGNOSIS — L91.0 HYPERTROPHIC SCAR: ICD-10-CM

## 2022-09-22 PROCEDURE — 99024 POSTOP FOLLOW-UP VISIT: CPT

## 2022-09-22 NOTE — HISTORY OF PRESENT ILLNESS
[FreeTextEntry1] : Pt is a 53 y/o F with PMH of NIDDM and HTN who was seen in 2013 for keloids to face s/p acne. Pt had left cheek removed and did well postoperatively. Now presents for a keloid to central chest x2 years, getting progressively larger; initially a small pimple. Has had steroid injection over 8-10 visits with dermatologist ([facility], [place]), reports no improvement. C/o discomfort and pruritus.\par \par Nonsmoker\par \par Interval hx (9/17/19): Pt presents for f/u- POD #11 s/p excision of central chest keloid x2 with steroid injections. Pt offers no complaints. States she went to the gym twice after procedure, started the day after, and was using the bench press.\par \par Interval hx (2/24/22): Pt presents today for f/u s/p excision of central chest keloid x2 in September 2019 with recurrence c/o burning pain, pressure and pruritus. Saw Dr. Padilla and is a candidate for RT post excision. \par \par Interval hx (6/23/22). Patient presents today for f/u c/o worsening chest keloids increasing in size and causing significant pain, discomfort and pressure. Pt has a new insurance now and would like to proceed with previously canceled surgery. \par \par Interval hx (8/29/22):  Pt is POD# 7 s/p excision of 2x chest keloids on 8/22/22. Path in chart.  Denies fever/chills/n/v/sob/LE pain or swelling. No longer on abx or pain meds. Reoprts pain and itching is fully resolved, very happy with results. s/p radiation to area 2x.  Has been using silicone tape to incision (not recommended by MD, started on her own). \par \par Interval hx (9/22/22):  Pt presents today 4 weeks s/p excision of chest keloids x2 with immediate RT. Doing well and happy with results. Compliant with silicone tape. Denies any f/c or drainage.

## 2022-09-22 NOTE — ASSESSMENT
[FreeTextEntry1] : 56 y/o F with central chest keloid x2, now s/p excision with steroid injection, with superficial dehiscence to both.\par Now with recurrence and painful keloid scars recalcitrant to conservative treatment. \par \par Now 4 weeks s/p re-excision of chest keloids x2 with immediate RT. Doing well.  \par \par - continue silicone tape \par - all post op and wound care instructions reviewed\par - s/s of infection reviewed\par - f/u 1 month for close f/u for keloid recurrence \par \par Due to COVID 19, pre-visit patient instructions were explained to the patient and their symptoms were checked upon arrival.  \par Masks were used by the health care providers and staff and the examination room was cleaned after the patient visit was completed.\par \par \par \par \par \par

## 2022-09-22 NOTE — PHYSICAL EXAM
[de-identified] : well-appearing, overweight, NAD [de-identified] : Central chest with two 10cm incisions both healing well, c/d/i,  no evidence of cellulitis/seroma/hematoma/drainage, flat, no evidence of HTS or recurrence

## 2022-09-22 NOTE — DATA REVIEWED
[FreeTextEntry1] : Pathology             Final\par \par No Documents Attached\par \par \par \par   [facility] Accession Number : [id number]\par Patient:   ELLA MOREAU\par \par \par Accession:                             [id number]\par \par Collected Date/Time:                   8/22/2022 12:09 EDT\par Received Date/Time:                    8/22/2022 13:52 EDT\par \par Surgical Pathology Report - Auth (Verified)\par \par Specimen(s) Submitted\par 1  Inferior chest keloid\par 2  Superior chest keloid\par \par Final Diagnosis\par 1. Inferior chest keloid:\par - Dermal scar with keloidal-type collagen, see comment.\par \par \par 2. Superior chest keloid:\par - Dermal scar with keloidal-type collagen, see comment.\par Verified by: Dennis Dennis M.D\par (Electronic Signature)\par Reported on: 08/23/22 14:23 EDT, [facility],\par [facility] [place] [address]\par Phone: [phone]   Fax: [phone]\par _________________________________________________________________\par \par \par Comment\par The findings are consistent with a hypertrophic scar or keloid; clinical\par correlation is required.\par \par Clinical Information\par Excision of two chest keloids\par Central chest keloids\par COVID (-)\par \par Perioperative Diagnosis\par Chest keloids\par \par \par Gross Description\par 1.  The specimen is received in formalin labeled "inferior chest keloid".\par The specimen consists of an ellipse of skin with underlying subcutaneous\par tissue measuring 7 x 1.8 x 1.7 cm.  The entire surface of the skin is\par covered in scar.  Cut surface reveals a thick tan-white subdermis.\par Representative sections are submitted.  (1 block)\par \par 2.  The specimen is received in formalin labeled "superior chest keloid".\par Specimen consists of an ellipse of skin with underlying subcutaneous\par tissue measuring 7.5 x 1.5 x 1.2 cm.  The entire surface of the skin is\par covered in scar.  Cut surface reveals a thick tan-white some dermis.\par Representative sections are submitted.  (1 block)\par \par Specimen was received and underwent gross examination at [facility], [address] [place] [address].\par \par 08/22/2022 16:42:48 EDT   GM\par \par  \par  Ordered by: LUX GOMES IV       Collected/Examined: [id number] 12:09PM       \par Verification Required       Stage: Final       \par  Performed at: [facility]       Resulted: [id number] 02:23PM       Last Updated: [id number] 02:23PM       Accession: [id number]

## 2022-10-25 ENCOUNTER — APPOINTMENT (OUTPATIENT)
Dept: PLASTIC SURGERY | Facility: CLINIC | Age: 57
End: 2022-10-25